# Patient Record
Sex: MALE | NOT HISPANIC OR LATINO | Employment: OTHER | URBAN - METROPOLITAN AREA
[De-identification: names, ages, dates, MRNs, and addresses within clinical notes are randomized per-mention and may not be internally consistent; named-entity substitution may affect disease eponyms.]

---

## 2018-05-29 RX ORDER — ASPIRIN 81 MG/1
81 TABLET, CHEWABLE ORAL
COMMUNITY
Start: 2014-06-18

## 2018-05-29 RX ORDER — AMLODIPINE BESYLATE 5 MG/1
2.5 TABLET ORAL
COMMUNITY
Start: 2014-06-18 | End: 2018-07-12 | Stop reason: DRUGHIGH

## 2018-05-29 RX ORDER — DONEPEZIL HYDROCHLORIDE 10 MG/1
10 TABLET, FILM COATED ORAL
COMMUNITY

## 2018-05-29 RX ORDER — ATORVASTATIN CALCIUM 10 MG/1
10 TABLET, FILM COATED ORAL
COMMUNITY
Start: 2014-06-18

## 2018-05-29 RX ORDER — LANCETS
EACH MISCELLANEOUS
Refills: 0 | COMMUNITY
Start: 2018-04-04

## 2018-05-29 RX ORDER — TAMSULOSIN HYDROCHLORIDE 0.4 MG/1
1 CAPSULE ORAL
COMMUNITY
Start: 2017-08-14 | End: 2018-05-31

## 2018-05-29 RX ORDER — BLOOD SUGAR DIAGNOSTIC
STRIP MISCELLANEOUS
Refills: 0 | COMMUNITY
Start: 2018-04-04

## 2018-05-29 RX ORDER — FLUTICASONE PROPIONATE 50 MCG
SPRAY, SUSPENSION (ML) NASAL
COMMUNITY
Start: 2014-06-18

## 2018-05-31 ENCOUNTER — OFFICE VISIT (OUTPATIENT)
Dept: UROLOGY | Facility: MEDICAL CENTER | Age: 83
End: 2018-05-31
Payer: MEDICARE

## 2018-05-31 VITALS
HEIGHT: 71 IN | SYSTOLIC BLOOD PRESSURE: 144 MMHG | WEIGHT: 200 LBS | DIASTOLIC BLOOD PRESSURE: 78 MMHG | BODY MASS INDEX: 28 KG/M2

## 2018-05-31 DIAGNOSIS — R35.1 NOCTURIA: ICD-10-CM

## 2018-05-31 DIAGNOSIS — R39.12 POOR URINARY STREAM: ICD-10-CM

## 2018-05-31 DIAGNOSIS — N40.1 BENIGN PROSTATIC HYPERPLASIA WITH LOWER URINARY TRACT SYMPTOMS, SYMPTOM DETAILS UNSPECIFIED: Primary | ICD-10-CM

## 2018-05-31 LAB
POST-VOID RESIDUAL VOLUME, ML POC: 75 ML
SL AMB  POCT GLUCOSE, UA: NORMAL
SL AMB LEUKOCYTE ESTERASE,UA: NORMAL
SL AMB POCT BILIRUBIN,UA: NORMAL
SL AMB POCT BLOOD,UA: NORMAL
SL AMB POCT CLARITY,UA: CLEAR
SL AMB POCT COLOR,UA: YELLOW
SL AMB POCT KETONES,UA: NORMAL
SL AMB POCT NITRITE,UA: NORMAL
SL AMB POCT PH,UA: 6.5
SL AMB POCT SPECIFIC GRAVITY,UA: 1.01
SL AMB POCT URINE PROTEIN: NORMAL
SL AMB POCT UROBILINOGEN: 0.2

## 2018-05-31 PROCEDURE — 81003 URINALYSIS AUTO W/O SCOPE: CPT | Performed by: UROLOGY

## 2018-05-31 PROCEDURE — 51798 US URINE CAPACITY MEASURE: CPT | Performed by: UROLOGY

## 2018-05-31 PROCEDURE — 99214 OFFICE O/P EST MOD 30 MIN: CPT | Performed by: UROLOGY

## 2018-05-31 RX ORDER — OXYBUTYNIN CHLORIDE 5 MG/1
5 TABLET ORAL
Qty: 90 TABLET | Refills: 3 | Status: SHIPPED | OUTPATIENT
Start: 2018-05-31 | End: 2018-07-12 | Stop reason: SINTOL

## 2018-05-31 RX ORDER — LINAGLIPTIN 5 MG/1
TABLET, FILM COATED ORAL
Refills: 3 | COMMUNITY
Start: 2018-04-04

## 2018-05-31 NOTE — LETTER
May 31, 2018     Enrrique Perez MD  [address]  [address]    Patient: Annamarie Olson   YOB: 1931   Date of Visit: 5/31/2018       Dear Dr Davis Clements: Thank you for referring Annamarie Olson to me for evaluation  Below are my notes for this consultation  If you have questions, please do not hesitate to call me  I look forward to following your patient along with you  Sincerely,        Riana Lees MD        CC: No Recipients  Riana Lees MD  5/31/2018 11:49 AM  Sign at close encounter  Assessment/Plan:  1  BPH with poor urinary stream/obstructive symptoms  The patient is status post TURP and obstructive symptoms have for the most part resolved  The patient voids with a steady stream and is advised to discontinue Flomax  He will reinstitute tamsulosin or Flomax should his stream we can when he discontinues it    2  Nocturia-the patient notes urination 3-5 times per night usually in small amounts that wakes him up  He did request medication for that and in view of his adequately low PVR he will be started on Ditropan 5 mg p o  q h s  and attempt to gently control his nocturia  The patient was advised that should dry mouth blurred vision constipation or change in cognition occur a should discontinue this medication immediately and contact me  He will otherwise be seen in 6 weeks for follow-up in this regard with measurement of PVR  No problem-specific Assessment & Plan notes found for this encounter  Diagnoses and all orders for this visit:    Benign prostatic hyperplasia with lower urinary tract symptoms, symptom details unspecified  -     POCT urine dip auto non-scope    Poor urinary stream  -     POCT Measure PVR    Nocturia  -     oxybutynin (DITROPAN) 5 mg tablet; Take 1 tablet (5 mg total) by mouth daily at bedtime  -     POCT Measure PVR;  Future    Other orders  -     ACCU-CHEK ABBY PLUS test strip; USE TO TEST ONCE DAILY  - ACCU-CHEK FASTCLIX LANCETS MISC; USE TO TEST ONCE DAILY  -     amLODIPine (NORVASC) 5 mg tablet; Take 2 5 mg by mouth  -     aspirin 81 mg chewable tablet; Chew 81 mg  -     atorvastatin (LIPITOR) 10 mg tablet; Take 10 mg by mouth  -     donepezil (ARICEPT) 10 mg tablet; Take 10 mg by mouth  -     fluticasone (FLONASE) 50 mcg/act nasal spray;   -     metFORMIN (GLUCOPHAGE) 1000 MG tablet; Take 1,000 mg by mouth  -     Discontinue: tamsulosin (FLOMAX) 0 4 mg; Take 1 capsule by mouth  -     traMADol-acetaminophen (ULTRACET) 37 5-325 mg per tablet; Take 37 5-325 mg by mouth  -     TRADJENTA 5 MG TABS;           Subjective:      Patient ID: Alfonso Kovacs is a 80 y o  male  HPI 59-year-old male with a history of BPH status post TURP with persistent nocturia  The patient underwent previous cystoscopy which revealed no evidence of bladder outlet obstruction  The patient presents for further evaluation of nocturia  He denies dysuria gross hematuria incontinence weakened urinary stream or postvoid dribbling  The following portions of the patient's history were reviewed and updated as appropriate: allergies, current medications, past family history, past medical history, past social history, past surgical history and problem list     Review of Systems   All other systems reviewed and are negative  Objective:      /78   Ht 5' 11" (1 803 m)   Wt 90 7 kg (200 lb)   BMI 27 89 kg/m²           Physical Exam   Constitutional: He is oriented to person, place, and time  He appears well-developed and well-nourished  No distress  HENT:   Head: Normocephalic and atraumatic  Eyes: Conjunctivae and EOM are normal    Neck: Neck supple  Pulmonary/Chest: Effort normal and breath sounds normal  No respiratory distress  Abdominal: Soft  He exhibits no distension  Genitourinary: Penis normal    Neurological: He is alert and oriented to person, place, and time  Psychiatric: He has a normal mood and affect   His behavior is normal  Judgment and thought content normal

## 2018-05-31 NOTE — PROGRESS NOTES
Assessment/Plan:  1  BPH with poor urinary stream/obstructive symptoms  The patient is status post TURP and obstructive symptoms have for the most part resolved  The patient voids with a steady stream and is advised to discontinue Flomax  He will reinstitute tamsulosin or Flomax should his stream we can when he discontinues it    2  Nocturia-the patient notes urination 3-5 times per night usually in small amounts that wakes him up  He did request medication for that and in view of his adequately low PVR he will be started on Ditropan 5 mg p o  q h s  and attempt to gently control his nocturia  The patient was advised that should dry mouth blurred vision constipation or change in cognition occur a should discontinue this medication immediately and contact me  He will otherwise be seen in 6 weeks for follow-up in this regard with measurement of PVR  No problem-specific Assessment & Plan notes found for this encounter  Diagnoses and all orders for this visit:    Benign prostatic hyperplasia with lower urinary tract symptoms, symptom details unspecified  -     POCT urine dip auto non-scope    Poor urinary stream  -     POCT Measure PVR    Nocturia  -     oxybutynin (DITROPAN) 5 mg tablet; Take 1 tablet (5 mg total) by mouth daily at bedtime  -     POCT Measure PVR; Future    Other orders  -     ACCU-CHEK ABBY PLUS test strip; USE TO TEST ONCE DAILY  -     ACCU-CHEK FASTCLIX LANCETS MISC; USE TO TEST ONCE DAILY  -     amLODIPine (NORVASC) 5 mg tablet; Take 2 5 mg by mouth  -     aspirin 81 mg chewable tablet; Chew 81 mg  -     atorvastatin (LIPITOR) 10 mg tablet; Take 10 mg by mouth  -     donepezil (ARICEPT) 10 mg tablet; Take 10 mg by mouth  -     fluticasone (FLONASE) 50 mcg/act nasal spray;   -     metFORMIN (GLUCOPHAGE) 1000 MG tablet; Take 1,000 mg by mouth  -     Discontinue: tamsulosin (FLOMAX) 0 4 mg; Take 1 capsule by mouth  -     traMADol-acetaminophen (ULTRACET) 37 5-325 mg per tablet;  Take 37 5-325 mg by mouth  -     TRADJENTA 5 MG TABS;           Subjective:      Patient ID: Veronica Blandon is a 80 y o  male  HPI 66-year-old male with a history of BPH status post TURP with persistent nocturia  The patient underwent previous cystoscopy which revealed no evidence of bladder outlet obstruction  The patient presents for further evaluation of nocturia  He denies dysuria gross hematuria incontinence weakened urinary stream or postvoid dribbling  The following portions of the patient's history were reviewed and updated as appropriate: allergies, current medications, past family history, past medical history, past social history, past surgical history and problem list     Review of Systems   All other systems reviewed and are negative  Objective:      /78   Ht 5' 11" (1 803 m)   Wt 90 7 kg (200 lb)   BMI 27 89 kg/m²          Physical Exam   Constitutional: He is oriented to person, place, and time  He appears well-developed and well-nourished  No distress  HENT:   Head: Normocephalic and atraumatic  Eyes: Conjunctivae and EOM are normal    Neck: Neck supple  Pulmonary/Chest: Effort normal and breath sounds normal  No respiratory distress  Abdominal: Soft  He exhibits no distension  Genitourinary: Penis normal    Neurological: He is alert and oriented to person, place, and time  Psychiatric: He has a normal mood and affect   His behavior is normal  Judgment and thought content normal

## 2018-07-12 ENCOUNTER — OFFICE VISIT (OUTPATIENT)
Dept: UROLOGY | Facility: MEDICAL CENTER | Age: 83
End: 2018-07-12
Payer: MEDICARE

## 2018-07-12 VITALS
DIASTOLIC BLOOD PRESSURE: 78 MMHG | HEIGHT: 71 IN | WEIGHT: 190 LBS | SYSTOLIC BLOOD PRESSURE: 164 MMHG | BODY MASS INDEX: 26.6 KG/M2

## 2018-07-12 DIAGNOSIS — R35.1 NOCTURIA: ICD-10-CM

## 2018-07-12 DIAGNOSIS — N40.1 BENIGN PROSTATIC HYPERPLASIA WITH LOWER URINARY TRACT SYMPTOMS, SYMPTOM DETAILS UNSPECIFIED: Primary | ICD-10-CM

## 2018-07-12 LAB
POST-VOID RESIDUAL VOLUME, ML POC: 171 ML
SL AMB  POCT GLUCOSE, UA: ABNORMAL
SL AMB LEUKOCYTE ESTERASE,UA: ABNORMAL
SL AMB POCT BILIRUBIN,UA: ABNORMAL
SL AMB POCT BLOOD,UA: ABNORMAL
SL AMB POCT CLARITY,UA: CLEAR
SL AMB POCT COLOR,UA: YELLOW
SL AMB POCT KETONES,UA: ABNORMAL
SL AMB POCT NITRITE,UA: ABNORMAL
SL AMB POCT PH,UA: 5.5
SL AMB POCT SPECIFIC GRAVITY,UA: 1.01
SL AMB POCT URINE PROTEIN: ABNORMAL
SL AMB POCT UROBILINOGEN: 0.2

## 2018-07-12 PROCEDURE — 81003 URINALYSIS AUTO W/O SCOPE: CPT | Performed by: UROLOGY

## 2018-07-12 PROCEDURE — 51798 US URINE CAPACITY MEASURE: CPT | Performed by: UROLOGY

## 2018-07-12 PROCEDURE — 99214 OFFICE O/P EST MOD 30 MIN: CPT | Performed by: UROLOGY

## 2018-07-12 RX ORDER — AMLODIPINE BESYLATE 2.5 MG/1
TABLET ORAL
Refills: 3 | COMMUNITY
Start: 2018-07-09

## 2018-07-12 NOTE — LETTER
July 12, 2018     Karely Campuzano MD  [address]  [address]    Patient: Ron Trinh   YOB: 1931   Date of Visit: 7/12/2018       Dear Dr Fahad Matamoros: Thank you for referring Jose Dawn to me for evaluation  Below are my notes for this consultation  If you have questions, please do not hesitate to call me  I look forward to following your patient along with you  Sincerely,        Prince Emery MD        CC: No Recipients  Prince Emery MD  7/12/2018 10:12 AM  Sign at close encounter  Assessment/Plan:  1  BPH with bladder outlet obstructive symptoms  The patient notes a good urinary stream without feelings of incomplete emptying  His PVR is between 75 and 175 cc for the most part  He denies any incontinence dysuria frequency urgency at this time although he does note nocturia about twice nightly  He did try oxybutynin for nocturia but took it once and had difficulty voiding and therefore discontinued at our request   The patient will return on an as needed basis and has stopped oxybutynin    No problem-specific Assessment & Plan notes found for this encounter  Diagnoses and all orders for this visit:    Benign prostatic hyperplasia with lower urinary tract symptoms, symptom details unspecified  -     POCT Measure PVR  -     POCT urine dip auto non-scope    Nocturia    Other orders  -     amLODIPine (NORVASC) 2 5 mg tablet;           Subjective:      Patient ID: Ron Trinh is a 80 y o  male  HPI 55-year-old male who returns for follow-up of anticholinergic medication for nocturia  The patient notes an episode of urinary retention while on the medication which resolved with the patient's discontinuance of Ditropan  The patient otherwise is voiding adequately with good stream no dysuria frequency urgency or incontinence    He will return on an as-needed basis and is discontinue Ditropan  The following portions of the patient's history were reviewed and updated as appropriate: allergies, current medications, past family history, past medical history, past social history, past surgical history and problem list     Review of Systems   Constitutional: Negative  HENT: Negative  Respiratory: Positive for shortness of breath  Being treated for lung cancer with radiation   Cardiovascular: Negative  Gastrointestinal: Negative  Musculoskeletal: Negative  Neurological: Negative  Psychiatric/Behavioral: Negative  Objective:      /78   Ht 5' 11" (1 803 m)   Wt 86 2 kg (190 lb)   BMI 26 50 kg/m²           Physical Exam   Constitutional: He is oriented to person, place, and time  He appears well-nourished  No distress  HENT:   Head: Atraumatic  Eyes: EOM are normal    Neck: Neck supple  Pulmonary/Chest: Effort normal  No respiratory distress  Abdominal: Soft  He exhibits no distension  Neurological: He is alert and oriented to person, place, and time  Psychiatric: He has a normal mood and affect  His behavior is normal  Judgment and thought content normal    Vitals reviewed

## 2018-07-12 NOTE — PROGRESS NOTES
Assessment/Plan:  1  BPH with bladder outlet obstructive symptoms  The patient notes a good urinary stream without feelings of incomplete emptying  His PVR is between 75 and 175 cc for the most part  He denies any incontinence dysuria frequency urgency at this time although he does note nocturia about twice nightly  He did try oxybutynin for nocturia but took it once and had difficulty voiding and therefore discontinued at our request   The patient will return on an as needed basis and has stopped oxybutynin    No problem-specific Assessment & Plan notes found for this encounter  Diagnoses and all orders for this visit:    Benign prostatic hyperplasia with lower urinary tract symptoms, symptom details unspecified  -     POCT Measure PVR  -     POCT urine dip auto non-scope    Nocturia    Other orders  -     amLODIPine (NORVASC) 2 5 mg tablet;           Subjective:      Patient ID: Aj Samuel is a 80 y o  male  HPI 77-year-old male who returns for follow-up of anticholinergic medication for nocturia  The patient notes an episode of urinary retention while on the medication which resolved with the patient's discontinuance of Ditropan  The patient otherwise is voiding adequately with good stream no dysuria frequency urgency or incontinence  He will return on an as-needed basis and is discontinue Ditropan  The following portions of the patient's history were reviewed and updated as appropriate: allergies, current medications, past family history, past medical history, past social history, past surgical history and problem list     Review of Systems   Constitutional: Negative  HENT: Negative  Respiratory: Positive for shortness of breath  Being treated for lung cancer with radiation   Cardiovascular: Negative  Gastrointestinal: Negative  Musculoskeletal: Negative  Neurological: Negative  Psychiatric/Behavioral: Negative            Objective:      /78   Ht 5' 11" (1 803 m)   Wt 86 2 kg (190 lb)   BMI 26 50 kg/m²          Physical Exam   Constitutional: He is oriented to person, place, and time  He appears well-nourished  No distress  HENT:   Head: Atraumatic  Eyes: EOM are normal    Neck: Neck supple  Pulmonary/Chest: Effort normal  No respiratory distress  Abdominal: Soft  He exhibits no distension  Neurological: He is alert and oriented to person, place, and time  Psychiatric: He has a normal mood and affect  His behavior is normal  Judgment and thought content normal    Vitals reviewed

## 2018-07-12 NOTE — PROGRESS NOTES
IPSS Questionnaire (AUA-7): Over the past month    1)  How often have you had a sensation of not emptying your bladder completely after you finish urinating? 4 - More than half the time   2)  How often have you had to urinate again less than two hours after you finished urinating? 2 - Less than half the time   3)  How often have you found you stopped and started again several times when you urinated? 2 - Less than half the time   4) How difficult have you found it to postpone urination? 2 - Less than half the time   5) How often have you had a weak urinary stream?  2 - Less than half the time   6) How often have you had to push or strain to begin urination? 4 - More than half the time   7) How many times did you most typically get up to urinate from the time you went to bed until the time you got up in the morning?   3 - 3 times   Total Score:  19

## 2018-08-18 DIAGNOSIS — N13.8 BPH WITH OBSTRUCTION/LOWER URINARY TRACT SYMPTOMS: Primary | ICD-10-CM

## 2018-08-18 DIAGNOSIS — N40.1 BPH WITH OBSTRUCTION/LOWER URINARY TRACT SYMPTOMS: Primary | ICD-10-CM

## 2018-08-19 RX ORDER — TAMSULOSIN HYDROCHLORIDE 0.4 MG/1
CAPSULE ORAL
Qty: 90 CAPSULE | Refills: 3 | Status: SHIPPED | OUTPATIENT
Start: 2018-08-19 | End: 2019-08-10 | Stop reason: SDUPTHER

## 2019-08-10 DIAGNOSIS — N13.8 BPH WITH OBSTRUCTION/LOWER URINARY TRACT SYMPTOMS: ICD-10-CM

## 2019-08-10 DIAGNOSIS — N40.1 BPH WITH OBSTRUCTION/LOWER URINARY TRACT SYMPTOMS: ICD-10-CM

## 2019-08-12 RX ORDER — TAMSULOSIN HYDROCHLORIDE 0.4 MG/1
CAPSULE ORAL
Qty: 90 CAPSULE | Refills: 3 | Status: SHIPPED | OUTPATIENT
Start: 2019-08-12

## 2020-08-13 DIAGNOSIS — N40.1 BPH WITH OBSTRUCTION/LOWER URINARY TRACT SYMPTOMS: ICD-10-CM

## 2020-08-13 DIAGNOSIS — N13.8 BPH WITH OBSTRUCTION/LOWER URINARY TRACT SYMPTOMS: ICD-10-CM

## 2020-08-13 RX ORDER — TAMSULOSIN HYDROCHLORIDE 0.4 MG/1
CAPSULE ORAL
Qty: 90 CAPSULE | Refills: 3 | OUTPATIENT
Start: 2020-08-13

## 2020-08-14 DIAGNOSIS — N13.8 BPH WITH OBSTRUCTION/LOWER URINARY TRACT SYMPTOMS: ICD-10-CM

## 2020-08-14 DIAGNOSIS — N40.1 BPH WITH OBSTRUCTION/LOWER URINARY TRACT SYMPTOMS: ICD-10-CM

## 2020-08-14 RX ORDER — TAMSULOSIN HYDROCHLORIDE 0.4 MG/1
CAPSULE ORAL
Qty: 90 CAPSULE | Refills: 3 | OUTPATIENT
Start: 2020-08-14

## 2020-08-17 DIAGNOSIS — N13.8 BPH WITH OBSTRUCTION/LOWER URINARY TRACT SYMPTOMS: ICD-10-CM

## 2020-08-17 DIAGNOSIS — N40.1 BPH WITH OBSTRUCTION/LOWER URINARY TRACT SYMPTOMS: ICD-10-CM

## 2020-08-17 NOTE — TELEPHONE ENCOUNTER
Message left on med refill VM from [facility] requesting refill of Tamsulosin on pt's behalf  Pt last [facility] 7/12/18 [facility]  At that time, pt was to return PRN  Will he need ov for further refills, as it has been over 2 years since his last ov?

## 2020-08-18 RX ORDER — TAMSULOSIN HYDROCHLORIDE 0.4 MG/1
CAPSULE ORAL
Qty: 90 CAPSULE | Refills: 3 | OUTPATIENT
Start: 2020-08-18

## 2020-08-18 NOTE — TELEPHONE ENCOUNTER
I spoke with Mrs Johanny Cerrato and explained that discharge/stable patient's should be refilling through their PCP  Mrs Johanny Cerrato agrees and will call the PCP today  No further action required